# Patient Record
Sex: FEMALE | Race: WHITE | NOT HISPANIC OR LATINO | Employment: FULL TIME | URBAN - METROPOLITAN AREA
[De-identification: names, ages, dates, MRNs, and addresses within clinical notes are randomized per-mention and may not be internally consistent; named-entity substitution may affect disease eponyms.]

---

## 2023-01-17 ENCOUNTER — APPOINTMENT (OUTPATIENT)
Dept: GENERAL RADIOLOGY | Facility: CLINIC | Age: 28
End: 2023-01-17
Attending: EMERGENCY MEDICINE
Payer: COMMERCIAL

## 2023-01-17 ENCOUNTER — HOSPITAL ENCOUNTER (EMERGENCY)
Facility: CLINIC | Age: 28
Discharge: HOME | End: 2023-01-17
Attending: EMERGENCY MEDICINE | Admitting: EMERGENCY MEDICINE
Payer: COMMERCIAL

## 2023-01-17 VITALS
HEART RATE: 89 BPM | WEIGHT: 293 LBS | TEMPERATURE: 98 F | RESPIRATION RATE: 16 BRPM | DIASTOLIC BLOOD PRESSURE: 91 MMHG | OXYGEN SATURATION: 99 % | HEIGHT: 63 IN | SYSTOLIC BLOOD PRESSURE: 171 MMHG | BODY MASS INDEX: 51.91 KG/M2

## 2023-01-17 DIAGNOSIS — R07.89 RIGHT-SIDED CHEST WALL PAIN: ICD-10-CM

## 2023-01-17 DIAGNOSIS — R10.13 EPIGASTRIC PAIN: ICD-10-CM

## 2023-01-17 LAB
ALBUMIN UR-MCNC: NEGATIVE MG/DL
APPEARANCE UR: CLEAR
ATRIAL RATE - MUSE: 82 BPM
BACTERIA #/AREA URNS HPF: ABNORMAL /HPF
BILIRUB UR QL STRIP: NEGATIVE
COLOR UR AUTO: YELLOW
DIASTOLIC BLOOD PRESSURE - MUSE: NORMAL MMHG
GLUCOSE UR STRIP-MCNC: NEGATIVE MG/DL
HCG UR QL: NEGATIVE
HGB UR QL STRIP: NEGATIVE
INTERNAL QC OK POCT: NORMAL
INTERPRETATION ECG - MUSE: NORMAL
KETONES UR STRIP-MCNC: NEGATIVE MG/DL
LEUKOCYTE ESTERASE UR QL STRIP: ABNORMAL
MUCOUS THREADS #/AREA URNS LPF: PRESENT /LPF
NITRATE UR QL: NEGATIVE
P AXIS - MUSE: 43 DEGREES
PH UR STRIP: 7.5 [PH] (ref 5–7)
POCT KIT EXPIRATION DATE: NORMAL
POCT KIT LOT NUMBER: NORMAL
PR INTERVAL - MUSE: 164 MS
QRS DURATION - MUSE: 94 MS
QT - MUSE: 390 MS
QTC - MUSE: 455 MS
R AXIS - MUSE: -25 DEGREES
RBC URINE: 3 /HPF
SP GR UR STRIP: 1.02 (ref 1–1.03)
SQUAMOUS EPITHELIAL: 7 /HPF
SYSTOLIC BLOOD PRESSURE - MUSE: NORMAL MMHG
T AXIS - MUSE: 1 DEGREES
UROBILINOGEN UR STRIP-MCNC: NORMAL MG/DL
VENTRICULAR RATE- MUSE: 82 BPM
WBC URINE: 1 /HPF

## 2023-01-17 PROCEDURE — 99285 EMERGENCY DEPT VISIT HI MDM: CPT | Mod: 25 | Performed by: EMERGENCY MEDICINE

## 2023-01-17 PROCEDURE — 81025 URINE PREGNANCY TEST: CPT | Performed by: EMERGENCY MEDICINE

## 2023-01-17 PROCEDURE — 93010 ELECTROCARDIOGRAM REPORT: CPT | Performed by: EMERGENCY MEDICINE

## 2023-01-17 PROCEDURE — 81001 URINALYSIS AUTO W/SCOPE: CPT | Performed by: EMERGENCY MEDICINE

## 2023-01-17 PROCEDURE — 71046 X-RAY EXAM CHEST 2 VIEWS: CPT

## 2023-01-17 PROCEDURE — 99283 EMERGENCY DEPT VISIT LOW MDM: CPT | Mod: GC | Performed by: EMERGENCY MEDICINE

## 2023-01-17 PROCEDURE — 93005 ELECTROCARDIOGRAM TRACING: CPT | Performed by: EMERGENCY MEDICINE

## 2023-01-17 RX ORDER — FAMOTIDINE 20 MG/1
20 TABLET, FILM COATED ORAL 2 TIMES DAILY
Qty: 30 TABLET | Refills: 0 | Status: SHIPPED | OUTPATIENT
Start: 2023-01-17 | End: 2023-02-01

## 2023-01-17 NOTE — ED PROVIDER NOTES
"ED Provider Note  [facility]      History     Chief Complaint   Patient presents with     Abdominal Pain     RUQ Pain radiating to R flank area that started sunday     Chest Pain     Intermittent chest pain that started yesterday. Patient reports it hurts to breath     HPI  Dayshah SONJA Gonzales is a 27 year old female who presents with epigastric pain that radiates to upper right abd, right posterior shoulder. On Sunday, pain was intermittant in epigastric region at onset with mild nausea that she attributed to reflux. Today, pain became continuous with radiation to right shoulder. No change with eating or positional changes. Last meal was this morning.  No chest pain, no recent trauma or known injury.    Pt is undergoing current tx for BV/yeast infection.    Past Medical History  History reviewed. No pertinent past medical history.  History reviewed. No pertinent surgical history.  famotidine (PEPCID) 20 MG tablet      No Known Allergies  Family History  History reviewed. No pertinent family history.  Social History   Social History     Tobacco Use     Smoking status: Never     Smokeless tobacco: Never   Substance Use Topics     Alcohol use: Yes     Comment: socially     Drug use: Never      Past medical history, past surgical history, medications, allergies, family history, and social history were reviewed with the patient. No additional pertinent items.      A medically appropriate review of systems was performed with pertinent positives and negatives noted in the HPI, and all other systems negative.    Physical Exam   BP: (!) 171/91  Pulse: 89  Temp: 98  F (36.7  C)  Resp: 16  Height: 160 cm (5' 3\")  Weight: (!) 162.8 kg (359 lb)  SpO2: 99 %  Physical Exam  Constitutional:       Appearance: She is obese.   HENT:      Head: Normocephalic.   Eyes:      Extraocular Movements: Extraocular movements intact.   Cardiovascular:      Rate and Rhythm: Normal rate and regular rhythm.      Heart " sounds: Normal heart sounds.   Pulmonary:      Effort: Pulmonary effort is normal.      Breath sounds: Normal breath sounds.   Abdominal:      Palpations: Abdomen is soft.      Tenderness: There is abdominal tenderness in the right upper quadrant. There is no guarding or rebound. Negative signs include Araujo's sign.      Comments: Pain to deep palpation in RUQ, right side, and right upper back to right posterior shoulder   Skin:     General: Skin is warm and dry.      Coloration: Skin is not jaundiced.   Neurological:      General: No focal deficit present.      Mental Status: She is alert and oriented to person, place, and time.           ED Course, Procedures, & Data      Procedures       Results for orders placed or performed during the hospital encounter of 01/17/23   XR Chest 2 Views     Status: None    Narrative    XR CHEST 2 VIEWS 1/17/2023 11:56 AM    HISTORY: chest pain    COMPARISON: None.      Impression    IMPRESSION: No acute cardiopulmonary findings.    NAVNEET CERVANTES MD         SYSTEM ID:  [id number]   UA with Microscopic reflex to Culture     Status: Abnormal    Specimen: Urine, Clean Catch   Result Value Ref Range    Color Urine Yellow Colorless, Straw, Light Yellow, Yellow    Appearance Urine Clear Clear    Glucose Urine Negative Negative mg/dL    Bilirubin Urine Negative Negative    Ketones Urine Negative Negative mg/dL    Specific Gravity Urine 1.024 1.003 - 1.035    Blood Urine Negative Negative    pH Urine 7.5 (H) 5.0 - 7.0    Protein Albumin Urine Negative Negative mg/dL    Urobilinogen Urine Normal Normal, 2.0 mg/dL    Nitrite Urine Negative Negative    Leukocyte Esterase Urine Trace (A) Negative    Bacteria Urine Few (A) None Seen /HPF    Mucus Urine Present (A) None Seen /LPF    RBC Urine 3 (H) <=2 /HPF    WBC Urine 1 <=5 /HPF    Squamous Epithelials Urine 7 (H) <=1 /HPF    Narrative    Urine Culture not indicated   EKG 12 lead     Status: None   Result Value Ref Range    Systolic Blood  Pressure  mmHg    Diastolic Blood Pressure  mmHg    Ventricular Rate 82 BPM    Atrial Rate 82 BPM    LA Interval 164 ms    QRS Duration 94 ms     ms    QTc 455 ms    P Axis 43 degrees    R AXIS -25 degrees    T Axis 1 degrees    Interpretation ECG       Sinus rhythm  Normal ECG  Unconfirmed report - interpretation of this ECG is computer generated - see medical record for final interpretation  Confirmed by - EMERGENCY ROOM, PHYSICIAN ([id number]),  JOSELUIS DILL ([id number]) on 1/17/2023 2:14:10 PM     hCG qual urine POCT     Status: Normal   Result Value Ref Range    HCG Qual Urine Negative Negative    Internal QC Check POCT Valid Valid    POCT Kit Lot Number [id number]     POCT Kit Expiration Date 03/31/2024      Medications - No data to display                 Results for orders placed or performed during the hospital encounter of 01/17/23   XR Chest 2 Views     Status: None    Narrative    XR CHEST 2 VIEWS 1/17/2023 11:56 AM    HISTORY: chest pain    COMPARISON: None.      Impression    IMPRESSION: No acute cardiopulmonary findings.    NAVNEET CERVANTES MD         SYSTEM ID:  [id number]   UA with Microscopic reflex to Culture     Status: Abnormal    Specimen: Urine, Clean Catch   Result Value Ref Range    Color Urine Yellow Colorless, Straw, Light Yellow, Yellow    Appearance Urine Clear Clear    Glucose Urine Negative Negative mg/dL    Bilirubin Urine Negative Negative    Ketones Urine Negative Negative mg/dL    Specific Gravity Urine 1.024 1.003 - 1.035    Blood Urine Negative Negative    pH Urine 7.5 (H) 5.0 - 7.0    Protein Albumin Urine Negative Negative mg/dL    Urobilinogen Urine Normal Normal, 2.0 mg/dL    Nitrite Urine Negative Negative    Leukocyte Esterase Urine Trace (A) Negative    Bacteria Urine Few (A) None Seen /HPF    Mucus Urine Present (A) None Seen /LPF    RBC Urine 3 (H) <=2 /HPF    WBC Urine 1 <=5 /HPF    Squamous Epithelials Urine 7 (H) <=1 /HPF    Narrative    Urine Culture not indicated   EKG  12 lead     Status: None (Preliminary result)   Result Value Ref Range    Systolic Blood Pressure  mmHg    Diastolic Blood Pressure  mmHg    Ventricular Rate 82 BPM    Atrial Rate 82 BPM    OK Interval 164 ms    QRS Duration 94 ms     ms    QTc 455 ms    P Axis 43 degrees    R AXIS -25 degrees    T Axis 1 degrees    Interpretation ECG Sinus rhythm  Normal ECG      hCG qual urine POCT     Status: Normal   Result Value Ref Range    HCG Qual Urine Negative Negative    Internal QC Check POCT Valid Valid    POCT Kit Lot Number [id number]     POCT Kit Expiration Date 03/31/2024      Medications - No data to display  Labs Ordered and Resulted from Time of ED Arrival to Time of ED Departure   ROUTINE UA WITH MICROSCOPIC REFLEX TO CULTURE - Abnormal       Result Value    Color Urine Yellow      Appearance Urine Clear      Glucose Urine Negative      Bilirubin Urine Negative      Ketones Urine Negative      Specific Gravity Urine 1.024      Blood Urine Negative      pH Urine 7.5 (*)     Protein Albumin Urine Negative      Urobilinogen Urine Normal      Nitrite Urine Negative      Leukocyte Esterase Urine Trace (*)     Bacteria Urine Few (*)     Mucus Urine Present (*)     RBC Urine 3 (*)     WBC Urine 1      Squamous Epithelials Urine 7 (*)    HCG QUALITATIVE URINE POCT - Normal    HCG Qual Urine Negative      Internal QC Check POCT Valid      POCT Kit Lot Number [id number]      POCT Kit Expiration Date 03/31/2024       XR Chest 2 Views   Final Result   IMPRESSION: No acute cardiopulmonary findings.      NAVNEET CERVANTES MD            SYSTEM ID:  [id number]               Assessment & Plan    Pt is a 26yo female with no significant known PMH that presents with RUQ pain that radiates to upper right posterior shoulder. Pain is constant, unchanged with eating, with no vomiting/diarrhea, afebrile, and thus not classic presentation for cholecystitis or choledocholithiasis. Pain elicited in back with deep palpation, likely 2/2  costochondritis. Recommend treatment with ibuprofen PRN and return to ED if pain worsens with eating.    ACS unlikely given negative cardiac history, negative EKG, negative CXR, and no CP.  Nephrolithiasis unlikely given negative UA.    Pt has no history of kidney stones or gallstones.  Pt is taking unknown antibiotic for BV, but GI infectious etiology unlikely due to lack of vomiting or diarrhea.    Plan:  1. UPT  2. UA  3. CXR  4. EKG  5. Pepcid BID  6. Ibuprofen for pain management    I have reviewed the nursing notes. I have reviewed the findings, diagnosis, plan and need for follow up with the patient.    New Prescriptions    No medications on file       Final diagnoses:   Epigastric pain   Right-sided chest wall pain     Jami Cedillo, MS4      --    ED Attending Physician Attestation    I Geetha Mac MD, cared for this patient with the Medical Student. I performed, or re-performed, the physical exam and medical decision-making. I have verified the accuracy of all the medical student findings and documentation above, and have edited as necessary.    Summary of HPI, PE, ED Course   Patient is a 27 year old female evaluated in the emergency department for epigastric and right sided pain wrapping around her right chest.  Patient with the pain initially started on Saturday.  Said that she had some mild alcohol on Saturday.  Says today she was able to eat some waffles and some alberto.  Said that she felt a little full after the food but she did not have any increased pain.  Says that she noticed the pain is mildly worse when she takes a big deep breath in the right lateral side.  She denies any chest pain in the middle of her chest.  Patient says that she is feeling comfortable right now.  No prior abdominal surgeries.  Says that she occasionally takes medications for GERD.. Exam and ED course notable for a chest x-ray and EKG that are both normal.  Patient has no reproducible abdominal pain on exam.   She is clear breath sounds bilaterally.  She has no dyspnea.  Patient's pain seems more epigastric in nature.  We discussed possibly doing a right upper quadrant ultrasound her pain seems atypical for cholelithiasis or cholecystitis and she has no fever no reproducible pain and the pain is not worse with eating.  Plan will be to discharge her home with some Pepcid for treatment of the epigastric pain and outpatient follow-up.. After the completion of care in the emergency department, the patient was discharged.    Critical Care & Procedures  Not applicable.    Medical Decision Making  The patient presented with a problem that is acute and uncomplicated.    The patient's evaluation involved:  ordering and review of 3+ test(s) (see separate area of note for details)    The patient's management involved only simple and very low risk treatment.         EKG Interpretation:      Interpreted by Geetha Mac MD  Time reviewed:1117   Symptoms at time of EKG: none   Rhythm: normal sinus   Rate: normal  Axis: left  Ectopy: none  Conduction: normal  ST Segments/ T Waves: No ST-T wave changes  Q Waves: none  Comparison to prior: Unchanged    Clinical Impression: normal EKG        Geetha Mac MD  Emergency Medicine     Geetha Mac  [facility] EMERGENCY DEPARTMENT  1/17/2023     Geetha Mac MD  01/17/23 2123

## 2023-01-17 NOTE — ED TRIAGE NOTES
Patient denies any hx of kidney stones, Patient reports hx of GERD.      Triage Assessment     Row Name 01/17/23 1106       Triage Assessment (Adult)    Airway WDL WDL       Respiratory WDL    Respiratory WDL WDL       Skin Circulation/Temperature WDL    Skin Circulation/Temperature WDL WDL       Cardiac WDL    Cardiac WDL X;chest pain       Chest Pain Assessment    Chest Pain Location anterior chest, left    Character aching

## 2023-01-17 NOTE — ED PROVIDER NOTES
"    [facility] EMERGENCY DEPARTMENT ([facility])  1/17/23  History     Chief Complaint   Patient presents with     Abdominal Pain     RUQ Pain radiating to R flank area that started sunday     Chest Pain     Intermittent chest pain that started yesterday. Patient reports it hurts to breath     HPI  Crystal Gonzales is a generally healthy 27 year old female who presents to the ED for evaluation of chest pain and abdominal pain.  ***     I have reviewed the Medications, Allergies, Past Medical and Surgical History, and Social History in the [facility] system.  PAST MEDICAL HISTORY: History reviewed. No pertinent past medical history.    PAST SURGICAL HISTORY: History reviewed. No pertinent surgical history.    Past medical history, past surgical history, medications, and allergies were reviewed with the patient. Additional pertinent items: {NONE:522473::\"None\"}    FAMILY HISTORY: History reviewed. No pertinent family history.    SOCIAL HISTORY:   Social History     Tobacco Use     Smoking status: Never     Smokeless tobacco: Never   Substance Use Topics     Alcohol use: Yes     Comment: socially     Social history was reviewed with the patient. Additional pertinent items: {NONE:899144::\"None\"}      Patient's Medications    No medications on file        No Known Allergies     Review of Systems  {Complete vs limited ROS:077228::\"A complete review of systems was performed with pertinent positives and negatives noted in the HPI, and all other systems negative.\"}    Physical Exam   BP: (!) 171/91  Pulse: 89  Temp: 98  F (36.7  C)  Resp: 16  Height: 160 cm (5' 3\")  Weight: (!) 162.8 kg (359 lb)  SpO2: 99 %      Physical Exam    ED Course, Procedures, & Data        Procedures       {EKG done?:251335::\" \"}    {ED Course Selections:928283}    Results for orders placed or performed during the hospital encounter of 01/17/23 (from the past 24 hour(s))   EKG 12 lead   Result Value Ref Range    Systolic Blood Pressure  mmHg    " Diastolic Blood Pressure  mmHg    Ventricular Rate 82 BPM    Atrial Rate 82 BPM    ND Interval 164 ms    QRS Duration 94 ms     ms    QTc 455 ms    P Axis 43 degrees    R AXIS -25 degrees    T Axis 1 degrees    Interpretation ECG Sinus rhythm  Normal ECG      hCG qual urine POCT   Result Value Ref Range    HCG Qual Urine Negative Negative    Internal QC Check POCT Valid Valid    POCT Kit Lot Number [id number]     POCT Kit Expiration Date 03/31/2024      Medications - No data to display          Medical Decision Making  The patient presented with a problem that is {[facility] Problem:518291}.    The patient's evaluation involved:  {[facility] Data:159357}    The patient's management involved {[facility] Management:962045}.      Assessments & Plan (with Medical Decision Making)   ***    I have reviewed the nursing notes.    I have reviewed the findings, diagnosis, plan and need for follow up with the patient.    New Prescriptions    No medications on file       Final diagnoses:   None       ***  1/17/2023   [facility] EMERGENCY DEPARTMENT

## 2023-01-17 NOTE — DISCHARGE INSTRUCTIONS
Your EKG is normal.     Your chest xray is normal.     Your urine test is normal.     Take the pepcid as prescribed for GERD/gastritis pain for the next 10 days.     Please make an appointment to follow up with Your Primary Care Provider in 3-5 days even if entirely better.    Return to the ER if symptoms worsen.

## 2023-10-25 ENCOUNTER — HOSPITAL ENCOUNTER (EMERGENCY)
Facility: CLINIC | Age: 28
Discharge: HOME | End: 2023-10-25
Attending: FAMILY MEDICINE | Admitting: FAMILY MEDICINE
Payer: COMMERCIAL

## 2023-10-25 ENCOUNTER — APPOINTMENT (OUTPATIENT)
Dept: GENERAL RADIOLOGY | Facility: CLINIC | Age: 28
End: 2023-10-25
Attending: NURSE PRACTITIONER
Payer: COMMERCIAL

## 2023-10-25 VITALS
HEART RATE: 92 BPM | HEIGHT: 63 IN | WEIGHT: 258 LBS | RESPIRATION RATE: 16 BRPM | TEMPERATURE: 98.2 F | BODY MASS INDEX: 45.71 KG/M2 | DIASTOLIC BLOOD PRESSURE: 84 MMHG | OXYGEN SATURATION: 95 % | SYSTOLIC BLOOD PRESSURE: 132 MMHG

## 2023-10-25 DIAGNOSIS — R06.02 SOB (SHORTNESS OF BREATH): ICD-10-CM

## 2023-10-25 DIAGNOSIS — R07.9 CHEST PAIN: ICD-10-CM

## 2023-10-25 DIAGNOSIS — R05.9 COUGH: ICD-10-CM

## 2023-10-25 LAB
ALBUMIN SERPL BCG-MCNC: 4.2 G/DL (ref 3.5–5.2)
ALP SERPL-CCNC: 62 U/L (ref 35–104)
ALT SERPL W P-5'-P-CCNC: 23 U/L (ref 0–50)
ANION GAP SERPL CALCULATED.3IONS-SCNC: 15 MMOL/L (ref 7–15)
AST SERPL W P-5'-P-CCNC: 31 U/L (ref 0–45)
ATRIAL RATE - MUSE: 78 BPM
BASOPHILS # BLD AUTO: 0.1 10E3/UL (ref 0–0.2)
BASOPHILS NFR BLD AUTO: 1 %
BILIRUB SERPL-MCNC: 0.5 MG/DL
BUN SERPL-MCNC: 7.8 MG/DL (ref 6–20)
CALCIUM SERPL-MCNC: 9.1 MG/DL (ref 8.6–10)
CHLORIDE SERPL-SCNC: 101 MMOL/L (ref 98–107)
CREAT SERPL-MCNC: 0.67 MG/DL (ref 0.51–0.95)
D DIMER PPP FEU-MCNC: 0.37 UG/ML FEU (ref 0–0.5)
DEPRECATED HCO3 PLAS-SCNC: 23 MMOL/L (ref 22–29)
DIASTOLIC BLOOD PRESSURE - MUSE: NORMAL MMHG
EGFRCR SERPLBLD CKD-EPI 2021: >90 ML/MIN/1.73M2
EOSINOPHIL # BLD AUTO: 0.3 10E3/UL (ref 0–0.7)
EOSINOPHIL NFR BLD AUTO: 2 %
ERYTHROCYTE [DISTWIDTH] IN BLOOD BY AUTOMATED COUNT: 14.1 % (ref 10–15)
GLUCOSE SERPL-MCNC: 95 MG/DL (ref 70–99)
HCT VFR BLD AUTO: 44.7 % (ref 35–47)
HGB BLD-MCNC: 14.4 G/DL (ref 11.7–15.7)
IMM GRANULOCYTES # BLD: 0 10E3/UL
IMM GRANULOCYTES NFR BLD: 0 %
INTERPRETATION ECG - MUSE: NORMAL
LYMPHOCYTES # BLD AUTO: 3.1 10E3/UL (ref 0.8–5.3)
LYMPHOCYTES NFR BLD AUTO: 26 %
MCH RBC QN AUTO: 29.6 PG (ref 26.5–33)
MCHC RBC AUTO-ENTMCNC: 32.2 G/DL (ref 31.5–36.5)
MCV RBC AUTO: 92 FL (ref 78–100)
MONOCYTES # BLD AUTO: 0.7 10E3/UL (ref 0–1.3)
MONOCYTES NFR BLD AUTO: 6 %
NEUTROPHILS # BLD AUTO: 8 10E3/UL (ref 1.6–8.3)
NEUTROPHILS NFR BLD AUTO: 65 %
NRBC # BLD AUTO: 0 10E3/UL
NRBC BLD AUTO-RTO: 0 /100
P AXIS - MUSE: 58 DEGREES
PLATELET # BLD AUTO: 369 10E3/UL (ref 150–450)
POTASSIUM SERPL-SCNC: 3.7 MMOL/L (ref 3.4–5.3)
PR INTERVAL - MUSE: 164 MS
PROCALCITONIN SERPL IA-MCNC: 0.05 NG/ML
PROT SERPL-MCNC: 8.6 G/DL (ref 6.4–8.3)
QRS DURATION - MUSE: 94 MS
QT - MUSE: 410 MS
QTC - MUSE: 467 MS
R AXIS - MUSE: -25 DEGREES
RBC # BLD AUTO: 4.86 10E6/UL (ref 3.8–5.2)
SODIUM SERPL-SCNC: 139 MMOL/L (ref 135–145)
SYSTOLIC BLOOD PRESSURE - MUSE: NORMAL MMHG
T AXIS - MUSE: 16 DEGREES
VENTRICULAR RATE- MUSE: 78 BPM
WBC # BLD AUTO: 12.2 10E3/UL (ref 4–11)

## 2023-10-25 PROCEDURE — 99285 EMERGENCY DEPT VISIT HI MDM: CPT | Mod: 25 | Performed by: FAMILY MEDICINE

## 2023-10-25 PROCEDURE — 250N000009 HC RX 250: Performed by: NURSE PRACTITIONER

## 2023-10-25 PROCEDURE — 99284 EMERGENCY DEPT VISIT MOD MDM: CPT | Mod: FS | Performed by: FAMILY MEDICINE

## 2023-10-25 PROCEDURE — 36415 COLL VENOUS BLD VENIPUNCTURE: CPT | Performed by: NURSE PRACTITIONER

## 2023-10-25 PROCEDURE — 94640 AIRWAY INHALATION TREATMENT: CPT | Performed by: FAMILY MEDICINE

## 2023-10-25 PROCEDURE — 84145 PROCALCITONIN (PCT): CPT | Performed by: NURSE PRACTITIONER

## 2023-10-25 PROCEDURE — 71046 X-RAY EXAM CHEST 2 VIEWS: CPT

## 2023-10-25 PROCEDURE — 85379 FIBRIN DEGRADATION QUANT: CPT | Performed by: NURSE PRACTITIONER

## 2023-10-25 PROCEDURE — 93005 ELECTROCARDIOGRAM TRACING: CPT | Performed by: FAMILY MEDICINE

## 2023-10-25 PROCEDURE — 80053 COMPREHEN METABOLIC PANEL: CPT | Performed by: NURSE PRACTITIONER

## 2023-10-25 PROCEDURE — 85025 COMPLETE CBC W/AUTO DIFF WBC: CPT | Performed by: NURSE PRACTITIONER

## 2023-10-25 PROCEDURE — 93010 ELECTROCARDIOGRAM REPORT: CPT | Performed by: FAMILY MEDICINE

## 2023-10-25 RX ORDER — IPRATROPIUM BROMIDE AND ALBUTEROL SULFATE 2.5; .5 MG/3ML; MG/3ML
3 SOLUTION RESPIRATORY (INHALATION) ONCE
Status: COMPLETED | OUTPATIENT
Start: 2023-10-25 | End: 2023-10-25

## 2023-10-25 RX ORDER — PREDNISONE 20 MG/1
TABLET ORAL
Qty: 10 TABLET | Refills: 0 | Status: SHIPPED | OUTPATIENT
Start: 2023-10-25

## 2023-10-25 RX ORDER — AMOXICILLIN 500 MG/1
500 CAPSULE ORAL 3 TIMES DAILY
Qty: 30 CAPSULE | Refills: 0 | Status: SHIPPED | OUTPATIENT
Start: 2023-10-25 | End: 2023-11-04

## 2023-10-25 RX ORDER — CODEINE PHOSPHATE/GUAIFENESIN 10-100MG/5
5 LIQUID (ML) ORAL EVERY 4 HOURS PRN
Qty: 118 ML | Refills: 0 | Status: SHIPPED | OUTPATIENT
Start: 2023-10-25 | End: 2023-10-30

## 2023-10-25 RX ADMIN — IPRATROPIUM BROMIDE AND ALBUTEROL SULFATE 3 ML: .5; 3 SOLUTION RESPIRATORY (INHALATION) at 12:30

## 2023-10-25 ASSESSMENT — ACTIVITIES OF DAILY LIVING (ADL)
ADLS_ACUITY_SCORE: 35
ADLS_ACUITY_SCORE: 35

## 2023-10-25 NOTE — ED PROVIDER NOTES
ED Provider Note  [facility]      History     Chief Complaint   Patient presents with    Cough     Patient presents due to intermittent chest pain with coughing. Patient reports being sick for 1 month. Patient has now lost her voice for 2 weeks. Patient was prescribed a round of antibiotics but never diagnosed with illness. RSV, Flu and COVID came back negative. Patient was told she has a respiratory illness. Patient is not getting better.     SAMI Gonzales is a 28 year old female with no significant past medical history who presents for evaluation of cough, shortness of breath and intermittent chest pain x1 month.  States that initially started as a cough and then developed flulike symptoms with runny nose and fever.  After the flulike symptoms resolved but the cough persisted.  2 weeks ago she lost her voice.  Cough has been very bothersome and persistent.  Despite this she has been able to intermittently work.  Does note though she has become more short of breath especially when lying down and has been noticing more audible wheezing.    She reportedly went to an urgent care 2 weeks ago where they tested her for COVID, RSV and influenza.  She states these were all negative.  They gave her a Ventolin inhaler and a course of doxycycline antibiotics.  She states she completed the antibiotics and has continued to use the inhaler, but despite this symptoms have continued to worsen.  She reports she feels most short of breath when lying down and has been trying to sleep on multiple pillows.  Has a little bit of dyspnea on exertion but not as significant as the symptoms she experiences when she lays down.    She reports intermittent chest pain and pain in her mid back when she is coughing really hard.  Denies chest pain when she is not coughing. denies any abdominal pain, nausea vomiting or diarrhea.  No ongoing fevers.  No fatigue.    Past Medical History  No past medical history on  "file.  No past surgical history on file.  amoxicillin (AMOXIL) 500 MG capsule  guaiFENesin-codeine (GUAIFENESIN AC) 100-10 MG/5ML syrup  predniSONE (DELTASONE) 20 MG tablet      No Known Allergies  Family History  No family history on file.  Social History   Social History     Tobacco Use    Smoking status: Never    Smokeless tobacco: Never   Substance Use Topics    Alcohol use: Yes     Comment: socially    Drug use: Never         A medically appropriate review of systems was performed with pertinent positives and negatives noted in the HPI, and all other systems negative.    Physical Exam   BP: 132/88  Pulse: 82  Temp: 98.2  F (36.8  C)  Resp: 16  Height: 160 cm (5' 3\")  Weight: 117 kg (258 lb)  SpO2: 93 %  Physical Exam  Vitals and nursing note reviewed.   Constitutional:       Appearance: She is obese.      Comments: Hoarse voice, frequent coughing   HENT:      Head: Normocephalic and atraumatic.   Cardiovascular:      Rate and Rhythm: Normal rate and regular rhythm.      Heart sounds: Normal heart sounds.   Pulmonary:      Effort: Pulmonary effort is normal.      Breath sounds: Wheezing present.      Comments: Frequent cough  Musculoskeletal:         General: No swelling. Normal range of motion.      Right lower leg: No edema.      Left lower leg: No edema.   Skin:     General: Skin is warm and dry.      Capillary Refill: Capillary refill takes less than 2 seconds.   Neurological:      General: No focal deficit present.      Mental Status: She is alert and oriented to person, place, and time.   Psychiatric:         Mood and Affect: Mood normal.         Behavior: Behavior normal.           ED Course, Procedures, & Data     ED Course as of 10/25/23 2109   Wed Oct 25, 2023   1405 D-Dimer Quantitative: 0.37   1405 WBC(!): 12.2     Procedures            EKG Interpretation:      Interpreted by JUAN ANTONIO Sanford CNP  Time reviewed: 1215  Symptoms at time of EKG: cough, shortness of breath, mild chest pain   Rhythm: " normal sinus   Rate: normal  Axis: normal  Ectopy: none  Conduction: normal  ST Segments/ T Waves: No ST-T wave changes  Q Waves: none  Comparison to prior: No old EKG available    Clinical Impression: normal EKG        Results for orders placed or performed during the hospital encounter of 10/25/23   XR Chest 2 Views     Status: None    Narrative    CHEST TWO VIEWS  10/25/2023 12:58 PM     HISTORY:  Cough. Shortness of breath. Wheezing.    COMPARISON: 1/17/2023.      Impression    IMPRESSION: Mild ill-defined opacity at the right lung base may be  related to pneumonia or atelectasis. The left lung is clear. No  pleural effusions.    VERNON CLEMENT MD         SYSTEM ID:  [id number]   D dimer quantitative     Status: Normal   Result Value Ref Range    D-Dimer Quantitative 0.37 0.00 - 0.50 ug/mL FEU    Narrative    This D-dimer assay is intended for use in conjunction with a clinical pretest probability assessment model to exclude pulmonary embolism (PE) and deep venous thrombosis (DVT) in outpatients suspected of PE or DVT. The cut-off value is 0.50 ug/mL FEU.   Comprehensive metabolic panel     Status: Abnormal   Result Value Ref Range    Sodium 139 135 - 145 mmol/L    Potassium 3.7 3.4 - 5.3 mmol/L    Carbon Dioxide (CO2) 23 22 - 29 mmol/L    Anion Gap 15 7 - 15 mmol/L    Urea Nitrogen 7.8 6.0 - 20.0 mg/dL    Creatinine 0.67 0.51 - 0.95 mg/dL    GFR Estimate >90 >60 mL/min/1.73m2    Calcium 9.1 8.6 - 10.0 mg/dL    Chloride 101 98 - 107 mmol/L    Glucose 95 70 - 99 mg/dL    Alkaline Phosphatase 62 35 - 104 U/L    AST 31 0 - 45 U/L    ALT 23 0 - 50 U/L    Protein Total 8.6 (H) 6.4 - 8.3 g/dL    Albumin 4.2 3.5 - 5.2 g/dL    Bilirubin Total 0.5 <=1.2 mg/dL   Procalcitonin     Status: Abnormal   Result Value Ref Range    Procalcitonin 0.05 (H) <0.05 ng/mL   CBC with platelets and differential     Status: Abnormal   Result Value Ref Range    WBC Count 12.2 (H) 4.0 - 11.0 10e3/uL    RBC Count 4.86 3.80 - 5.20 10e6/uL     Hemoglobin 14.4 11.7 - 15.7 g/dL    Hematocrit 44.7 35.0 - 47.0 %    MCV 92 78 - 100 fL    MCH 29.6 26.5 - 33.0 pg    MCHC 32.2 31.5 - 36.5 g/dL    RDW 14.1 10.0 - 15.0 %    Platelet Count 369 150 - 450 10e3/uL    % Neutrophils 65 %    % Lymphocytes 26 %    % Monocytes 6 %    % Eosinophils 2 %    % Basophils 1 %    % Immature Granulocytes 0 %    NRBCs per 100 WBC 0 <1 /100    Absolute Neutrophils 8.0 1.6 - 8.3 10e3/uL    Absolute Lymphocytes 3.1 0.8 - 5.3 10e3/uL    Absolute Monocytes 0.7 0.0 - 1.3 10e3/uL    Absolute Eosinophils 0.3 0.0 - 0.7 10e3/uL    Absolute Basophils 0.1 0.0 - 0.2 10e3/uL    Absolute Immature Granulocytes 0.0 <=0.4 10e3/uL    Absolute NRBCs 0.0 10e3/uL   EKG 12 lead     Status: None   Result Value Ref Range    Systolic Blood Pressure  mmHg    Diastolic Blood Pressure  mmHg    Ventricular Rate 78 BPM    Atrial Rate 78 BPM    DC Interval 164 ms    QRS Duration 94 ms     ms    QTc 467 ms    P Axis 58 degrees    R AXIS -25 degrees    T Axis 16 degrees    Interpretation ECG       Sinus rhythm  Normal ECG  Unconfirmed report - interpretation of this ECG is computer generated - see medical record for final interpretation    Confirmed by - EMERGENCY ROOM, PHYSICIAN ([id number]),  SHANE ESCALERA ([id number]) on 10/25/2023 1:51:59 PM     CBC with platelets differential     Status: Abnormal    Narrative    The following orders were created for panel order CBC with platelets differential.  Procedure                               Abnormality         Status                     ---------                               -----------         ------                     CBC with platelets and d...[[id number]]  Abnormal            Final result                 Please view results for these tests on the individual orders.     Medications   ipratropium - albuterol 0.5 mg/2.5 mg/3 mL (DUONEB) neb solution 3 mL (3 mLs Nebulization $Given 10/25/23 1237)     Labs Ordered and Resulted from Time of ED Arrival to Time of ED  Departure   COMPREHENSIVE METABOLIC PANEL - Abnormal       Result Value    Sodium 139      Potassium 3.7      Carbon Dioxide (CO2) 23      Anion Gap 15      Urea Nitrogen 7.8      Creatinine 0.67      GFR Estimate >90      Calcium 9.1      Chloride 101      Glucose 95      Alkaline Phosphatase 62      AST 31      ALT 23      Protein Total 8.6 (*)     Albumin 4.2      Bilirubin Total 0.5     PROCALCITONIN - Abnormal    Procalcitonin 0.05 (*)    CBC WITH PLATELETS AND DIFFERENTIAL - Abnormal    WBC Count 12.2 (*)     RBC Count 4.86      Hemoglobin 14.4      Hematocrit 44.7      MCV 92      MCH 29.6      MCHC 32.2      RDW 14.1      Platelet Count 369      % Neutrophils 65      % Lymphocytes 26      % Monocytes 6      % Eosinophils 2      % Basophils 1      % Immature Granulocytes 0      NRBCs per 100 WBC 0      Absolute Neutrophils 8.0      Absolute Lymphocytes 3.1      Absolute Monocytes 0.7      Absolute Eosinophils 0.3      Absolute Basophils 0.1      Absolute Immature Granulocytes 0.0      Absolute NRBCs 0.0     D DIMER QUANTITATIVE - Normal    D-Dimer Quantitative 0.37       XR Chest 2 Views   Final Result   IMPRESSION: Mild ill-defined opacity at the right lung base may be   related to pneumonia or atelectasis. The left lung is clear. No   pleural effusions.      VERNON CLEMENT MD            SYSTEM ID:  [id number]             Critical care was not performed.     Medical Decision Making  The patient's presentation was of moderate complexity (an acute illness with systemic symptoms).    The patient's evaluation involved:  review of external note(s) from 3+ sources (see separate area of note for details)  review of 3+ test result(s) ordered prior to this encounter (see separate area of note for details)  strong consideration of a test (CT PE considered) that was ultimately deferred  ordering and/or review of 3+ test(s) in this encounter (see separate area of note for details)  independent interpretation of testing  performed by another health professional (chest xray)    The patient's management necessitated moderate risk (prescription drug management including medications given in the ED).    Assessment & Plan    Crystal Gonzales is a 28 year old female with no significant past medical history who presents for evaluation of cough, shortness of breath and intermittent chest pain x1 month.  She is vitally stable in the emergency department however is a bit hypoxic satting 93% on room air.    Patient was evaluated 2 weeks ago, at that time she tested negative for COVID, RSV and influenza.  She was treated with a course of doxycycline and given an inhaler.  Despite this her symptoms have continued to worsen.  She reports recently she has had several nights of worsening orthopnea and wheezing when she lies down, reports last night her breathing woke her from sleep.  Will obtain a more comprehensive work-up.  Unable to rule out PE with PERC, will obtain D-dimer with comprehensive labs, EKG.  We will also obtain chest x-ray.    Mild leukocytosis WBC 12.2, no anemia, mild elevation in procalcitonin to 0.05.  No electrolyte or metabolic abnormalities.  Normal renal function.  D-dimer is negative, very low suspicion for PE will not pursue further CT PE imaging at this time.    The x-ray imaging as well as radiologist report which showed an opacity was suggestive of possible pneumonia in the right lung base.  Procalcitonin only mildly elevated at 0.05.  Slight leukocytosis at 12.2.  Patient has already been covered for atypical pneumonia with course of doxycycline that she completed previously, will discharge with amoxicillin as well as a 5-day burst of steroid treatment.      It is probably more likely she has a postviral reactive airway process so we will treat for both that and the potential pneumonia given imaging results today.    I have reviewed the nursing notes. I have reviewed the findings, diagnosis, plan and need for follow up  with the patient who is in agreement with the plan for discharge.    Discharge Medication List as of 10/25/2023  2:58 PM        START taking these medications    Details   amoxicillin (AMOXIL) 500 MG capsule Take 1 capsule (500 mg) by mouth 3 times daily for 10 days, Disp-30 capsule, R-0, Local Print      guaiFENesin-codeine (GUAIFENESIN AC) 100-10 MG/5ML syrup Take 5 mLs by mouth every 4 hours as needed for congestion, Disp-118 mL, R-0, Local Print      predniSONE (DELTASONE) 20 MG tablet Take two tablets (= 40mg) each day for 5 (five) days, Disp-10 tablet, R-0, Local Print             Final diagnoses:   SOB (shortness of breath)   Cough   Chest pain     JUAN ANTONIO Sanford CNP   [facility] EMERGENCY DEPARTMENT  10/25/2023     Heidy Mason APRN CNP  10/25/23 2111  --    ED Attending Physician Attestation    I Kaz Grant MD, cared for this patient with the Advanced Practice Provider ([facility]). I have performed a history and physical examination of the patient independent of the [facility]. I reviewed the [facility]'s documentation above and agree with the documented findings and plan of care. I personally provided a substantive portion of the care for this patient, including the complete Medical Decision Making. Please see the [facility]'s documentation for full details.    Summary of HPI, PE, ED Course   Patient is a 28 year old female evaluated in the emergency department for 3-day history of worsening cough, shortness of breath and intermittent chest pain.  Recently seen for respiratory symptoms and treated with a full course of doxycycline, initially improved but symptoms returned. Exam and ED course notable for negative EKG, troponin, D-dimer, slightly elevated WBC, chest x-ray with question infiltrate at the base.. After the completion of care in the emergency department, the patient was treated with a course of amoxicillin, steroids, and felt to be appropriate for outpatient management and so discharged. We  discussed the indications for emergency department return and follow-up.  Stable for discharge.        Kaz Grant MD  Emergency Medicine        Kaz Grant MD  10/29/23 2877

## 2023-10-25 NOTE — ED TRIAGE NOTES
Patient presents due to intermittent chest pain with coughing. Patient reports being sick for 1 month. Patient has now lost her voice for 2 weeks. Patient was prescribed a round of antibiotics but never diagnosed with illness. RSV, Flu and COVID came back negative. Patient was told she has a respiratory illness. Patient is not getting better.     Triage Assessment (Adult)       Row Name 10/25/23 1128          Triage Assessment    Airway WDL WDL        Respiratory WDL    Respiratory WDL WDL        Skin Circulation/Temperature WDL    Skin Circulation/Temperature WDL WDL        Cardiac WDL    Cardiac WDL X;chest pain        Chest Pain Assessment    Chest Pain Location midsternal     Character sharp     Duration intermittent with cough        Peripheral/Neurovascular WDL    Peripheral Neurovascular WDL WDL        Cognitive/Neuro/Behavioral WDL    Cognitive/Neuro/Behavioral WDL WDL

## 2023-10-25 NOTE — DISCHARGE INSTRUCTIONS
TODAY'S VISIT:  You were seen today for cough, shortness of breath and chest pain  -You likely have a postviral inflammation, however it is possible that you have a pneumonia.  - If you had any labs or imaging/radiology tests performed today, you should also discuss these tests with your usual provider.     FOLLOW-UP:  Please make an appointment to follow up with:  - Your Primary Care Provider. If you do not have a PCP, please call the Primary Care Center (phone: [phone] for an appointment    - Have your provider review the results from today's visit with you again to make sure no further follow-up or additional testing is needed based on those results.     PRESCRIPTIONS / MEDICATIONS:  - Amoxicillin.  Take full course of antibiotics even if feeling completely better.  -Prednisone.  Take for 5 days.    -Guaifenesin codeine -take as needed for cough.  May make you tired.  -Continue to use your Ventolin inhaler.  -Take Tylenol and/or ibuprofen for pain.    OTHER INSTRUCTIONS:  -Stay well-hydrated and, drink plenty of water and fluids as this can help thin your secretions and help with your cough.  -Use your inhaler as prescribed, try using it even before you engage in activities that make you need to use it as a preventative    RETURN TO THE EMERGENCY DEPARTMENT  Return to the Emergency Department at any time for any new or worsening symptoms or any concerns.